# Patient Record
Sex: FEMALE | Race: WHITE
[De-identification: names, ages, dates, MRNs, and addresses within clinical notes are randomized per-mention and may not be internally consistent; named-entity substitution may affect disease eponyms.]

---

## 2018-10-31 ENCOUNTER — HOSPITAL ENCOUNTER (EMERGENCY)
Dept: HOSPITAL 97 - ER | Age: 58
LOS: 1 days | Discharge: HOME | End: 2018-11-01
Payer: COMMERCIAL

## 2018-10-31 DIAGNOSIS — N13.2: Primary | ICD-10-CM

## 2018-10-31 LAB
CAOX CRY URNS QL MICRO: (no result)
UA COMPLETE W REFLEX CULTURE PNL UR: (no result)

## 2018-10-31 PROCEDURE — 96374 THER/PROPH/DIAG INJ IV PUSH: CPT

## 2018-10-31 PROCEDURE — 80053 COMPREHEN METABOLIC PANEL: CPT

## 2018-10-31 PROCEDURE — 85025 COMPLETE CBC W/AUTO DIFF WBC: CPT

## 2018-10-31 PROCEDURE — 74176 CT ABD & PELVIS W/O CONTRAST: CPT

## 2018-10-31 PROCEDURE — 87086 URINE CULTURE/COLONY COUNT: CPT

## 2018-10-31 PROCEDURE — 99284 EMERGENCY DEPT VISIT MOD MDM: CPT

## 2018-10-31 PROCEDURE — 36415 COLL VENOUS BLD VENIPUNCTURE: CPT

## 2018-10-31 PROCEDURE — 87088 URINE BACTERIA CULTURE: CPT

## 2018-10-31 PROCEDURE — 81015 MICROSCOPIC EXAM OF URINE: CPT

## 2018-10-31 PROCEDURE — 76377 3D RENDER W/INTRP POSTPROCES: CPT

## 2018-10-31 PROCEDURE — 81003 URINALYSIS AUTO W/O SCOPE: CPT

## 2018-11-01 VITALS — DIASTOLIC BLOOD PRESSURE: 71 MMHG | OXYGEN SATURATION: 100 % | SYSTOLIC BLOOD PRESSURE: 134 MMHG | TEMPERATURE: 97.9 F

## 2018-11-01 LAB
ALBUMIN SERPL BCP-MCNC: 3.7 G/DL (ref 3.4–5)
ALP SERPL-CCNC: 59 U/L (ref 45–117)
ALT SERPL W P-5'-P-CCNC: 24 U/L (ref 12–78)
AST SERPL W P-5'-P-CCNC: 20 U/L (ref 15–37)
BUN BLD-MCNC: 24 MG/DL (ref 7–18)
GLUCOSE SERPLBLD-MCNC: 74 MG/DL (ref 74–106)
HCT VFR BLD CALC: 40.3 % (ref 36–45)
LYMPHOCYTES # SPEC AUTO: 2.2 K/UL (ref 0.7–4.9)
MCH RBC QN AUTO: 30.6 PG (ref 27–35)
MCV RBC: 90.7 FL (ref 80–100)
PMV BLD: 8.6 FL (ref 7.6–11.3)
POTASSIUM SERPL-SCNC: 4.1 MMOL/L (ref 3.5–5.1)
RBC # BLD: 4.45 M/UL (ref 3.86–4.86)

## 2018-11-01 NOTE — RAD REPORT
EXAM DESCRIPTION:  CT - Stone Protocol - 11/1/2018 3:57 am

 

CLINICAL HISTORY:  Flank pain.

L flank pain

 

COMPARISON:  CT ABD PELVIS W WO CONTRAST dated 12/30/2013

 

TECHNIQUE:  Axial images were obtained without oral or IV contrast. Lack of contrast limits solid org
an and vascular assessment. The field-of-view spans the entirety of the  system partially obscuring
 uppermost abdomen and lung bases. Coronal reformatted images were obtained and reviewed.

 

All CT scans are performed using dose optimization technique as appropriate and may include automated
 exposure control or mA/KV adjustment according to patient size.

 

FINDINGS:  The lower lung fields are clear.

 

Imaged portions of the liver and spleen show no suspicious findings on non-contrast imaging. The panc
reas and adrenal glands are normal. No pathologic lymphadenopathy in the abdomen or pelvis.

 

5 mm stone (630 HU) is present along the bladder mucosa adjacent to the left UVJ resulting in mild le
ft hydronephrosis. Additional stones are present in the left kidney. No right-sided stone or hydronep
hrosis.

 

No bowel obstruction, free air, free fluid or abscess. The appendix is not identified as a discrete s
tructure, however, no secondary findings of appendicitis are identified.

 

No significant bony abnormality.

 

IMPRESSION:  5 mm stone along the bladder mucosal aspect of the left UVJ with moderate left hydroneph
rosis.

 

Additional left nephrolithiasis.

## 2018-11-01 NOTE — EDPHYS
Physician Documentation                                                                           

 [facility]                                                                

Name: Julian Macias                                                                                  

Age: 58 yrs                                                                                       

Sex: Female                                                                                       

: 1960                                                                                   

MRN: [id number]                                                                                   

Arrival Date: 10/31/2018                                                                          

Time: 21:22                                                                                       

Account#: [id number]                                                                            

Bed 13                                                                                            

Private MD: Damaso Hernandez V                                                                      

ED Physician Ricco Tafoya                                                                      

HPI:                                                                                              

                                                                                             

01:02 This 58 yrs old  Female presents to ER via Ambulatory with complaints of Back  wa  

      Pain, Urinary Problem.                                                                      

01:03 This 58 yrs old  Female presents to ER via Ambulatory with complaints of Back  wa  

      Pain, Urinary Problem.                                                                      

01:02 The patient presents with pain that is acute, with no known mechanism of injury.        wa  

01:03 The patient complains of pain in the . The patient complains of pain in the left flank. wa  

      The pain does not radiate. Onset: The symptoms/episode began/occurred today. Modifying      

      factors: The symptoms are alleviated by nothing. the symptoms are aggravated by             

      nothing. Associated signs and symptoms: Pertinent positives: urinary frequency, c/o         

      urine frequency. burning urination x 3 days. began abx for UTI 2 days ago but symptoms      

      not improved. states now R flank pain, Pertinent negatives: fever, headache, vomiting.      

      Severity of pain: At its worst the pain was mild in the emergency department the pain       

      is unchanged. The patient has not experienced similar symptoms in the past. The patient     

      has not recently seen a physician, had abx called in by her doctor. denies fever, n/v.      

                                                                                                  

Historical:                                                                                       

- Allergies:                                                                                      

10/31                                                                                             

22:18 No Known Allergies;                                                                     kr2 

- Home Meds:                                                                                      

22:18 Cefuroxime Oral 250 mg twice a day [Active];                                            kr2 

- PMHx:                                                                                           

22:18 None;                                                                                   kr2 

- PSHx:                                                                                           

22:18 None;                                                                                   kr2 

                                                                                                  

- Immunization history:: Adult Immunizations not up to date.                                      

- Social history:: Smoking status: Patient/guardian denies using tobacco.                         

- Ebola Screening: : No symptoms or risks identified at this time.                                

                                                                                                  

                                                                                                  

ROS:                                                                                              

                                                                                             

01:06 Constitutional: Negative for fever, chills, and weight loss, Eyes: Negative for injury, wa  

      pain, redness, and discharge, ENT: Negative for injury, pain, and discharge, Neck:          

      Negative for injury, pain, and swelling, Cardiovascular: Negative for chest pain,           

      palpitations, and edema, Respiratory: Negative for shortness of breath, cough,              

      wheezing, and pleuritic chest pain, Abdomen/GI: Negative for abdominal pain, nausea,        

      vomiting, diarrhea, and constipation, Back: Negative for injury and pain, MS/Extremity:     

      Negative for injury and deformity, Skin: Negative for injury, rash, and discoloration,      

      Neuro: Negative for headache, weakness, numbness, tingling, and seizure, Psych:             

      Negative for depression, anxiety, suicide ideation, homicidal ideation, and                 

      hallucinations.                                                                             

      : Positive for urinary symptoms, flank pain, burning with urination.                      

                                                                                                  

Exam:                                                                                             

:06 Constitutional:  This is a well developed, well nourished patient who is awake, alert,  wa  

      and in no acute distress. Head/Face:  Normocephalic, atraumatic. Eyes:  Pupils equal        

      round and reactive to light, extra-ocular motions intact.  Lids and lashes normal.          

      Conjunctiva and sclera are non-icteric and not injected.  Cornea within normal limits.      

      Periorbital areas with no swelling, redness, or edema. ENT:  Nares patent. No nasal         

      discharge, no septal abnormalities noted.  Tympanic membranes are normal and external       

      auditory canals are clear.  Oropharynx with no redness, swelling, or masses, exudates,      

      or evidence of obstruction, uvula midline.  Mucous membranes moist. Neck:  Trachea          

      midline, no thyromegaly or masses palpated, and no cervical lymphadenopathy.  Supple,       

      full range of motion without nuchal rigidity, or vertebral point tenderness.  No            

      Meningismus. Chest/axilla:  Normal chest wall appearance and motion.  Nontender with no     

      deformity.  No lesions are appreciated. Cardiovascular:  Regular rate and rhythm with a     

      normal S1 and S2.  No gallops, murmurs, or rubs.  Normal PMI, no JVD.  No pulse             

      deficits. Respiratory:  Lungs have equal breath sounds bilaterally, clear to                

      auscultation and percussion.  No rales, rhonchi or wheezes noted.  No increased work of     

      breathing, no retractions or nasal flaring. Abdomen/GI:  Soft, non-tender, with normal      

      bowel sounds.  No distension or tympany.  No guarding or rebound.  No evidence of           

      tenderness throughout. Back:  No spinal tenderness.  No costovertebral tenderness.          

      Full range of motion. Skin:  Warm, dry with normal turgor.  Normal color with no            

      rashes, no lesions, and no evidence of cellulitis. MS/ Extremity:  Pulses equal, no         

      cyanosis.  Neurovascular intact.  Full, normal range of motion. Neuro:  Awake and           

      alert, GCS 15, oriented to person, place, time, and situation.  Cranial nerves II-XII       

      grossly intact.  Motor strength 5/5 in all extremities.  Sensory grossly intact.            

      Cerebellar exam normal.  Normal gait. Psych:  Awake, alert, with orientation to person,     

      place and time.  Behavior, mood, and affect are within normal limits.                       

                                                                                                  

Vital Signs:                                                                                      

10/31                                                                                             

22:19 Pulse 60; Resp 17; Temp 98.6; Pulse Ox 99% ; Weight 60.33 kg; Height 5 ft. 6 in.        kr2 

      (167.64 cm); Pain 10;                                                                     

23:00  / 71; Pulse 56; Resp 18 S; Temp 97.9(O); Pulse Ox 100% on R/A;                   cc3 

                                                                                             

00:20  / 78; Pulse 58; Resp 17 S; Pulse Ox 100% on R/A;                                 cc3 

01:30  / 77; Pulse 57; Resp 16 S; Pulse Ox 99% on R/A;                                  cc3 

02:15  / 72; Pulse 59; Resp 18 S; Pulse Ox 100% on R/A;                                 cc3 

10/31                                                                                             

22:19 Body Mass Index 21.47 (60.33 kg, 167.64 cm)                                             kr2 

                                                                                                  

MDM:                                                                                              

10/31                                                                                             

22:46 Patient medically screened.                                                             wa  

                                                                                             

01:06 Differential diagnosis: nephrolithiasis, pyelonephritis, UTI.                           wa  

01:08 Test interpretation: by ED physician or midlevel provider: labs noted for 2+ blood.     wa  

      otherwise nml.                                                                              

02:03 Data reviewed: vital signs, nurses notes. Test interpretation: by ED physician or       wa  

      midlevel provider: moderate L hydronephrosis secondary to obstructing 5 mm stone at         

      left UVJ. L sided nephrolithiasis. Response to treatment: the patient's symptoms have       

      markedly improved after treatment.                                                          

02:10 Data reviewed: post-void residual, 10 cc's.                                             wa  

                                                                                                  

10/31                                                                                             

21:32 Order name: Urine Culture                                                               [facility] 

10/31                                                                                             

21:32 Order name: Urine Microscopic Only                                                      [facility] 

10/31                                                                                             

21:33 Order name: Urine Culture                                                               [facility]

10/31                                                                                             

21:33 Order name: Urine Microscopic Only; Complete Time: 00:01                                [facility]

10/31                                                                                             

23:48 Order name: Urine Dipstick--Ancillary (enter results); Complete Time: 00:01             em  

                                                                                             

00:03 Order name: CBC with Diff; Complete Time: :                                         wa  

10/31                                                                                             

21:32 Order name: Urine Dipstick-Ancillary (obtain specimen); Complete Time: 23:25            snw 

                                                                                             

00:03 Order name: CMP; Complete Time: 01:                                                   wa  

                                                                                             

00:38 Order name: Stone Protocol                                                              EDMS

                                                                                                  

Administered Medications:                                                                         

10/31                                                                                             

23:35 Drug: Tylenol 1000 mg Route: PO;                                                        cc3 

                                                                                             

00:15 Follow up: Response: No adverse reaction; Pain is decreased                             cc3 

02:10 Drug: TORadol 30 mg Route: IVP; Site: right antecubital;                                cc3 

02:25 Follow up: Response: No adverse reaction; Pain is decreased                             cc3 

                                                                                                  

                                                                                                  

Disposition:                                                                                      

18 02:07 Discharged to Home. Impression: Left Ureteral Stone, Left Hydronephrosis.          

- Condition is Stable.                                                                            

                                                                                                  

- Prescriptions for Flomax 0.4 mg Oral Capsule, Sust. Release 24 hr - take 1 capsule by           

  ORAL route once daily for 7 days 1/2 hour following the same meal each day; 7 capsule.          

- Medication Reconciliation Form, Thank You Letter, Antibiotic Education, Prescription            

  Opioid Use form.                                                                                

- Follow up: Quan Morillo MD; When: 5 - 6 days; Reason: if symptoms worsen and or             

  does not improve.                                                                               

- Problem is new.                                                                                 

- Symptoms have improved.                                                                         

- Notes: follow up with the urologist for further evaluation if your pain does not                

  improve and or worsen. take flomax as prescribed to help pass the stone. return to ER           

  for rapidly worsening and or severe pain                                                        

                                                                                                  

                                                                                                  

Signatures:                                                                                       

Dispatcher [employer]                           EDMS                                                 

Agata Aaron, FNP-C                 FNP-Csnw                                                  

Jeffrey Tripathi, LVN                       LVN  em                                                   

Ricco Tafoya MD MD wa Reaves, Karey, RN                       RN   kr2                                                  

Stephania Paz                             cc3                                                  

                                                                                                  

Corrections: (The following items were deleted from the chart)                                    

00:38 00:06 Abdomen Pelvis Wo Con+CT.RAD.BRZ ordered. EDMS                                    EDMS

00:53 00:36 Labs - recollect needed ordered. em                                               em  

02:32 02:07 2018 02:07 Discharged to Home. Impression: Left Ureteral Stone; Left        cc3 

      Hydronephrosis. Condition is Stable. Forms are Medication Reconciliation Form, Thank        

      You Letter, Antibiotic Education, Prescription Opioid Use. Follow up: Quan Morillo;       

      When: 5 - 6 days; Reason: if symptoms worsen and or does not improve. Problem is new.       

      Symptoms have improved. wa                                                                  

                                                                                                  

**************************************************************************************************

## 2018-11-01 NOTE — ER
Nurse's Notes                                                                                     

 [facility]                                                                

Name: Julian Macias                                                                                  

Age: 58 yrs                                                                                       

Sex: Female                                                                                       

: 1960                                                                                   

MRN: [id number]                                                                                   

Arrival Date: 10/31/2018                                                                          

Time: 21:22                                                                                       

Account#: [id number]                                                                            

Bed 13                                                                                            

Private MD: Damaso Hernandez V                                                                      

Diagnosis: Left Ureteral Stone;Left Hydronephrosis                                                

                                                                                                  

Presentation:                                                                                     

10/31                                                                                             

22:15 Presenting complaint: Patient states: I started antibiotics for a UTI on Tuesday and I  kr2 

      started having back pain this evening. Denies blood in urine, denies n/v/d. Transition      

      of care: patient was not received from another setting of care. Onset of symptoms was       

      2018. Risk Assessment: Do you want to hurt yourself or someone else?            

      Patient reports no desire to harm self or others. Initial Sepsis Screen: Does the           

      patient meet any 2 criteria? No. Patient's initial sepsis screen is negative. Does the      

      patient have a suspected source of infection? Yes: Dysuria/Frequency/Urgency/UTI. Care      

      prior to arrival: None.                                                                     

22:15 Method Of Arrival: Ambulatory                                                           [facility] 

22:15 Acuity: BE 4                                                                           kr2 

                                                                                                  

Triage Assessment:                                                                                

22:18 General: Appears in no apparent distress. uncomfortable, well groomed, well developed,  kr2 

      well nourished, Behavior is calm, cooperative, appropriate for age. Pain: Complains of      

      pain in lower back Pain does not radiate. Pain currently is 5 out of 10 on a pain           

      scale. Quality of pain is described as aching, Is continuous, Alleviated by nothing.        

      Musculoskeletal: Circulation, motion, and sensation intact.                                 

                                                                                                  

Historical:                                                                                       

- Allergies:                                                                                      

22:18 No Known Allergies;                                                                     kr2 

- Home Meds:                                                                                      

22:18 Cefuroxime Oral 250 mg twice a day [Active];                                            kr2 

- PMHx:                                                                                           

22:18 None;                                                                                   kr2 

- PSHx:                                                                                           

22:18 None;                                                                                   kr2 

                                                                                                  

- Immunization history:: Adult Immunizations not up to date.                                      

- Social history:: Smoking status: Patient/guardian denies using tobacco.                         

- Ebola Screening: : No symptoms or risks identified at this time.                                

                                                                                                  

                                                                                                  

Screenin:00 Abuse screen: Denies threats or abuse. Denies injuries from another. Nutritional        cc3 

      screening: No deficits noted. Tuberculosis screening: No symptoms or risk factors           

      identified. Fall Risk Ambulatory Aid- None/Bed Rest/Nurse Assist (0 pts). Gait-             

      Normal/Bed Rest/Wheelchair (0 pts) Mental Status- Oriented to own ability (0 pts).          

                                                                                                  

Assessment:                                                                                       

23:00 Reassessment: Patient appears in no apparent distress at this time. Patient and/or      cc3 

      family updated on plan of care and expected duration. Pain level reassessed. Patient is     

      alert, oriented x 3, equal unlabored respirations, skin warm/dry/pink. Neuro: Level of      

      Consciousness is awake, alert, obeys commands, Oriented to person, place, time,             

      situation, Appropriate for age.                                                             

                                                                                             

00:55 Reassessment: Patient appears in no apparent distress at this time. Patient and/or      cc3 

      family updated on plan of care and expected duration. Pain level reassessed. Patient is     

      alert, oriented x 3, equal unlabored respirations, skin warm/dry/pink. Patient came         

      back from CT scan department, bladder scan done with 10 mL retained urine, Dr. Tafoya       

      informed.                                                                                   

01:30 Reassessment: Patient appears in no apparent distress at this time. Patient and/or      cc3 

      family updated on plan of care and expected duration. Pain level reassessed. Patient is     

      alert, oriented x 3, equal unlabored respirations, skin warm/dry/pink.                      

02:25 Reassessment: Patient appears in no apparent distress at this time. Patient and/or      cc3 

      family updated on plan of care and expected duration. Pain level reassessed. Patient is     

      alert, oriented x 3, equal unlabored respirations, skin warm/dry/pink. Dr. Tafoya           

      discharged the patient with prescription given. IV cannula removed and patient left ER      

      vitally stable and ambulatory with her .                                             

                                                                                                  

Vital Signs:                                                                                      

10/31                                                                                             

22:19 Pulse 60; Resp 17; Temp 98.6; Pulse Ox 99% ; Weight 60.33 kg; Height 5 ft. 6 in.        kr2 

      (167.64 cm); Pain /10;                                                                     

23:00  / 71; Pulse 56; Resp 18 S; Temp 97.9(O); Pulse Ox 100% on R/A;                   cc3 

11                                                                                             

00:20  / 78; Pulse 58; Resp 17 S; Pulse Ox 100% on R/A;                                 cc3 

01:30  / 77; Pulse 57; Resp 16 S; Pulse Ox 99% on R/A;                                  cc3 

02:15  / 72; Pulse 59; Resp 18 S; Pulse Ox 100% on R/A;                                 cc3 

10/31                                                                                             

22:19 Body Mass Index 21.47 (60.33 kg, 167.64 cm)                                             kr2 

                                                                                                  

ED Course:                                                                                        

10/31                                                                                             

21:22 Patient arrived in ED.                                                                  am2 

21:23 Damaso Hernandez MD is Private Physician.                                                 am2 

22:17 Triage completed.                                                                       kr2 

22:46 Ricco Tafoya MD is Attending Physician.                                             wa  

22:51 Stephania Paz is Primary Nurse.                                                      cc3 

23:00 Arm band placed on left wrist.                                                          cc3 

23:00 Patient has correct armband on for positive identification. Bed in low position. Call   cc3 

      light in reach. Side rails up X 1. Pulse ox on. NIBP on.                                    

                                                                                             

00:15 Inserted saline lock: 20 gauge in right antecubital area, using aseptic technique.      cc3 

      Blood collected.                                                                            

00:57 Stone Protocol In Process Unspecified.                                                  EDMS

02:04 Quan Morillo MD is Referral Physician.                                              wa  

02:25 No provider procedures requiring assistance completed. IV discontinued, intact,         cc3 

      bleeding controlled, No redness/swelling at site. Pressure dressing applied.                

                                                                                                  

Administered Medications:                                                                         

10/31                                                                                             

23:35 Drug: Tylenol 1000 mg Route: PO;                                                        cc3 

                                                                                             

00:15 Follow up: Response: No adverse reaction; Pain is decreased                             cc3 

02:10 Drug: TORadol 30 mg Route: IVP; Site: right antecubital;                                cc3 

02:25 Follow up: Response: No adverse reaction; Pain is decreased                             cc3 

                                                                                                  

                                                                                                  

Outcome:                                                                                          

02:07 Discharge ordered by MD.                                                                wa  

02:25 Discharged to home ambulatory, with family.                                             cc3 

02:25 Condition: stable                                                                           

02:25 Discharge instructions given to patient, family, Instructed on discharge instructions,      

      follow up and referral plans. medication usage, Demonstrated understanding of               

      instructions, follow-up care, medications, Prescriptions given X 1.                         

02:32 Patient left the ED.                                                                    cc3 

                                                                                                  

Signatures:                                                                                       

Dispatcher [employer]                           EDMS                                                 

Milena Hunt                               am2                                                  

Ricco Tafoya MD MD wa Reaves, Karey, RN                       RN   kr2                                                  

Stephania Paz                             cc3                                                  

                                                                                                  

Corrections: (The following items were deleted from the chart)                                    

03:15 00:55 Reassessment: Patient appears in no apparent distress at this time. Patient       cc3 

      and/or family updated on plan of care and expected duration. Pain level reassessed.         

      Patient is alert, oriented x 3, equal unlabored respirations, skin warm/dry/pink.           

      Patient came back from CT scan department, bladder scan done with 10 mL retained urine.     

      cc3                                                                                         

                                                                                                  

**************************************************************************************************

## 2021-01-31 ENCOUNTER — HOSPITAL ENCOUNTER (EMERGENCY)
Dept: HOSPITAL 97 - ER | Age: 61
Discharge: HOME | End: 2021-01-31
Payer: COMMERCIAL

## 2021-01-31 VITALS — SYSTOLIC BLOOD PRESSURE: 121 MMHG | OXYGEN SATURATION: 100 % | DIASTOLIC BLOOD PRESSURE: 70 MMHG

## 2021-01-31 VITALS — TEMPERATURE: 97.2 F

## 2021-01-31 DIAGNOSIS — N20.2: Primary | ICD-10-CM

## 2021-01-31 LAB
ALBUMIN SERPL BCP-MCNC: 4 G/DL (ref 3.4–5)
ALP SERPL-CCNC: 58 U/L (ref 45–117)
ALT SERPL W P-5'-P-CCNC: 23 U/L (ref 12–78)
AST SERPL W P-5'-P-CCNC: 19 U/L (ref 15–37)
BUN BLD-MCNC: 23 MG/DL (ref 7–18)
CAOX CRY URNS QL MICRO: (no result)
GLUCOSE SERPLBLD-MCNC: 125 MG/DL (ref 74–106)
HCT VFR BLD CALC: 42.9 % (ref 36–45)
LIPASE SERPL-CCNC: 50 U/L (ref 73–393)
LYMPHOCYTES # SPEC AUTO: 1.1 K/UL (ref 0.7–4.9)
PMV BLD: 8.6 FL (ref 7.6–11.3)
POTASSIUM SERPL-SCNC: 3.7 MMOL/L (ref 3.5–5.1)
RBC # BLD: 4.74 M/UL (ref 3.86–4.86)

## 2021-01-31 PROCEDURE — 80076 HEPATIC FUNCTION PANEL: CPT

## 2021-01-31 PROCEDURE — 99284 EMERGENCY DEPT VISIT MOD MDM: CPT

## 2021-01-31 PROCEDURE — 96361 HYDRATE IV INFUSION ADD-ON: CPT

## 2021-01-31 PROCEDURE — 96374 THER/PROPH/DIAG INJ IV PUSH: CPT

## 2021-01-31 PROCEDURE — 74177 CT ABD & PELVIS W/CONTRAST: CPT

## 2021-01-31 PROCEDURE — 81015 MICROSCOPIC EXAM OF URINE: CPT

## 2021-01-31 PROCEDURE — 81003 URINALYSIS AUTO W/O SCOPE: CPT

## 2021-01-31 PROCEDURE — 36415 COLL VENOUS BLD VENIPUNCTURE: CPT

## 2021-01-31 PROCEDURE — 83690 ASSAY OF LIPASE: CPT

## 2021-01-31 PROCEDURE — 85025 COMPLETE CBC W/AUTO DIFF WBC: CPT

## 2021-01-31 PROCEDURE — 96375 TX/PRO/DX INJ NEW DRUG ADDON: CPT

## 2021-01-31 PROCEDURE — 80048 BASIC METABOLIC PNL TOTAL CA: CPT

## 2021-01-31 NOTE — RAD REPORT
EXAM DESCRIPTION:  CT - Abdomen   Pelvis W Contrast - 1/31/2021 11:52 am

 

CLINICAL HISTORY:  Abdominal pain

 

COMPARISON:  2018

 

TECHNIQUE:  Computed axial tomography of the abdomen pelvis was obtained. 100 cc Isovue-300 was admin
istered intravenously. Oral contrast was not requested which limits evaluation of bowel.

 

All CT scans are performed using dose optimization technique as appropriate and may include automated
 exposure control or mA/KV adjustment according to patient size.

 

FINDINGS:  The liver, spleen, pancreas, and adrenals appear unremarkable.

 

Nonobstructing left renal calculi. Small left renal cyst.

 

Delay in concentration contrast right kidney. Mild to moderate right hydronephrosis. Right ureter is 
dilated. 5 millimeter calculus right UVJ. Hounsfield unit 843.

 

There is no evidence of diverticulitis. Small umbilical hernia

 

IMPRESSION:  5 millimeter calculus right UPJ resulting in mild to moderate right hydronephrosis

## 2021-01-31 NOTE — EDPHYS
Physician Documentation                                                                           

 [facility]                                                                 

Name: Julian Macias                                                                                  

Age: 60 yrs                                                                                       

Sex: Female                                                                                       

: 1960                                                                                   

MRN: [id number]                                                                                   

Arrival Date: 2021                                                                          

Time: 10:26                                                                                       

Account#: [id number]                                                                            

Bed 4                                                                                             

Private MD:                                                                                       

ED Physician Lashanda Chahal                                                                    

HPI:                                                                                              

                                                                                             

12:11 This 60 yrs old  Female presents to ER via Ambulatory with complaints of       ma2 

      Kidney Pain.                                                                                

12:11 The patient complains of pain in the right low back. Onset: The symptoms/episode        ma2 

      began/occurred gradually, 3 hour(s) ago. Associated signs and symptoms: Pertinent           

      positives: nausea, vomiting, Pertinent negatives: urinary frequency, headache,              

      hematuria, nausea. Severity of pain: At its worst the pain was moderate in the              

      emergency department the pain is unchanged. The patient has not experienced similar         

      symptoms in the past.                                                                       

                                                                                                  

Historical:                                                                                       

- Allergies:                                                                                      

10:40 No Known Allergies;                                                                     hb  

- Home Meds:                                                                                      

10:40 None [Active];                                                                          hb  

- PMHx:                                                                                           

10:40 None;                                                                                   hb  

- PSHx:                                                                                           

10:40 D \T\ C;                                                                                  hb

                                                                                                  

- Immunization history:: Adult Immunizations up to date.                                          

- Social history:: Smoking status: Patient denies any tobacco usage or history of.                

  Patient/guardian denies using alcohol, street drugs, The patient lives with family.             

- Family history:: not pertinent.                                                                 

                                                                                                  

                                                                                                  

ROS:                                                                                              

12:11 Constitutional: Negative for fever, chills, and weight loss.                            ma2 

12:11 All other systems are negative.                                                             

                                                                                                  

Exam:                                                                                             

12:11 Constitutional:  This is a well developed, well nourished patient who is awake, alert,  ma2 

      and in no acute distress. Chest/axilla:  Normal chest wall appearance and motion.           

      Nontender with no deformity.  No lesions are appreciated. Cardiovascular:  Regular rate     

      and rhythm with a normal S1 and S2.  No gallops, murmurs, or rubs.  Normal PMI, no JVD.     

       No pulse deficits. Respiratory:  Lungs have equal breath sounds bilaterally, clear to      

      auscultation and percussion.  No rales, rhonchi or wheezes noted.  No increased work of     

      breathing, no retractions or nasal flaring. Abdomen/GI:  Soft, non-tender, with normal      

      bowel sounds.  No distension or tympany.  No guarding or rebound.  No evidence of           

      tenderness throughout. Back:  No spinal tenderness.  No costovertebral tenderness.          

      Full range of motion. Skin:  Warm, dry with normal turgor.  Normal color with no            

      rashes, no lesions, and no evidence of cellulitis. MS/ Extremity:  Pulses equal, no         

      cyanosis.  Neurovascular intact.  Full, normal range of motion. Neuro:  Awake and           

      alert, GCS 15, oriented to person, place, time, and situation.  Cranial nerves II-XII       

      grossly intact.  Motor strength 5/5 in all extremities.  Sensory grossly intact.            

      Cerebellar exam normal.  Normal gait.                                                       

                                                                                                  

Vital Signs:                                                                                      

10:38  / 68; Pulse 56; Resp 16; Temp 97.2; Pulse Ox 97% on R/A; Pain 8/10;              hb  

11:31  / 70; Pulse 66; Resp 16; Pulse Ox 100% ;                                         sv  

                                                                                                  

MDM:                                                                                              

10:35 Patient medically screened.                                                             ma2 

12:11 Differential diagnosis: nephrolithiasis, pyelonephritis, UTI. Data reviewed: vital      ma2 

      signs, nurses notes. Counseling: I had a detailed discussion with the patient and/or        

      guardian regarding: the historical points, exam findings, and any diagnostic results        

      supporting the discharge/admit diagnosis, the presence of at least one elevated blood       

      pressure reading (>120/80) during this emergency department visit, the need for             

      outpatient follow up. Response to treatment: the patient's symptoms have markedly           

      improved after treatment.                                                                   

                                                                                                  

                                                                                             

10:40 Order name: Basic Metabolic Panel; Complete Time: 11:53                                 ma2 

                                                                                             

10:40 Order name: CBC with Diff; Complete Time: 11:53                                         ma2 

                                                                                             

10:40 Order name: Hepatic Function; Complete Time: 11:53                                      ma                                                                                             

10:40 Order name: Lipase; Complete Time: 11:                                                ma                                                                                             

11:15 Order name: Urine Dipstick--Ancillary (enter results); Complete Time: 11:53             eb  

                                                                                             

11:15 Order name: Urine Microscopic Only: verbal order per Dr. Chahal; Complete Time: 11:53  dh3 

                                                                                             

10:40 Order name: CT Abd/Pelvis - IV Contrast Only; Complete Time: 12:10                      ma2 

                                                                                             

10:40 Order name: IV Saline Lock; Complete Time: 11:10                                        ma2 

                                                                                             

10:40 Order name: Labs collected and sent; Complete Time: 11:10                               ma2 

                                                                                             

10:40 Order name: Urine Dipstick-Ancillary (obtain specimen); Complete Time: 11: 

                                                                                                  

Administered Medications:                                                                         

11:09 Drug: Zofran (Ondansetron) 4 mg Route: IVP; Site: right antecubital;                    hb  

12:00 Follow up: Response: No adverse reaction                                                hb  

11:10 Drug: NS 0.9% 1000 ml Route: IV; Rate: 1 bolus; Site: right antecubital;                hb  

12:00 Follow up: Response: No adverse reaction; IV Status: Completed infusion; IV Intake:     hb  

      1000ml                                                                                      

11:10 Drug: morphine 4 mg Route: IVP; Site: right antecubital;                                hb  

11:38 Follow up: Response: No adverse reaction                                                hb  

12:20 Drug: Flomax 0.4 mg Route: PO;                                                          hb  

12:55 Follow up: Response: No adverse reaction                                                hb  

12:25 Drug: Zofran (Ondansetron) 4 mg Route: IVP; Site: right antecubital;                    hb  

12:55 Follow up: Response: No adverse reaction                                                hb  

12:25 Drug: morphine 2 mg Route: IVP; Site: right antecubital;                                hb  

12:55 Follow up: Response: No adverse reaction                                                hb  

12:57 CANCELLED (Duplicate Order): morphine 2 mg IVP once; (PAIN>8) RASS on ADMN: Combtv4,    hb  

      Very Agttd3, Agttd2, Rstlss1, AlertClm0, Drwsy-1, LtSdtn-2, ModSdtn-3, DpSdtn-4,            

      UnArsble-5 x2                                                                               

                                                                                                  

                                                                                                  

Disposition:                                                                                      

21 12:12 Discharged to Home. Impression: Calculus of kidney and ureter - right.             

- Condition is Stable.                                                                            

- Discharge Instructions: Kidney Stones, Easy-to-Read, Dietary Guidelines to Help                 

  Prevent Kidney Stones.                                                                          

- Prescriptions for Flomax 0.4 mg Oral Capsule, Sust. Release 24 hr - take 1 capsule by           

  ORAL route once daily 1/2 hour following the same meal each day; 30 capsule.                    

  Diclofenac Sodium 75 mg Oral Tablet Sustained Release - take 1 tablet by ORAL route 2           

  times per day; 30 tablet. Zofran 4 mg Oral Tablet - take 1 tablet by ORAL route every           

  12 hours As needed; 20 tablet.                                                                  

- Medication Reconciliation Form, Thank You Letter, Antibiotic Education, Prescription            

  Opioid Use, Work release form form.                                                             

- Follow up: Jose Chen MD; When: Tomorrow; Reason: If symptoms return.                     

                                                                                                  

                                                                                                  

                                                                                                  

Signatures:                                                                                       

Dispatcher [employer]                           Jacquelyn Alarcon RN                     RN                                                      

Lashanda Chahal MD MD   ma2                                                  

                                                                                                  

Corrections: (The following items were deleted from the chart)                                    

12:57 12:57 morphine 2 mg IVP once; (PAIN>8) RASS on ADMN: Combtv4, Very Agttd3, Agttd2,      hb  

      Rstlss1, AlertClm0, Drwsy-1, LtSdtn-2, ModSdtn-3, DpSdtn-4, UnArsble-5 x2 ordered. hb       

12:59 12:12 2021 12:12 Discharged to Home. Impression: Calculus of kidney and ureter -  hb  

      right. Condition is Stable. Prescriptions for Flomax 0.4 mg Oral Capsule, Sust. Release     

      24 hr - take 1 capsule by ORAL route once daily 1/2 hour following the same meal each       

      day; 30 capsule, Diclofenac Sodium 75 mg Oral Tablet Sustained Release - take 1 tablet      

      by ORAL route 2 times per day; 30 tablet. and Forms are Medication Reconciliation Form,     

      Thank You Letter, Antibiotic Education, Prescription Opioid Use. Follow up: Jose Chen; When: Tomorrow; Reason: If symptoms return. ma2                                    

                                                                                                  

**************************************************************************************************

## 2021-01-31 NOTE — ER
Nurse's Notes                                                                                     

 [facility]                                                                 

Name: Julian Macias                                                                                  

Age: 60 yrs                                                                                       

Sex: Female                                                                                       

: 1960                                                                                   

MRN: [id number]                                                                                   

Arrival Date: 2021                                                                          

Time: 10:26                                                                                       

Account#: [id number]                                                                            

Bed 4                                                                                             

Private MD:                                                                                       

Diagnosis: Calculus of kidney and ureter-right                                                    

                                                                                                  

Presentation:                                                                                     

                                                                                             

10:38 Chief complaint: N/V and right flank pain upon waking today. Diarrhea x 1. Coronavirus  hb  

      screen: Client presents with at least one sign or symptom that may indicate                 

      coronavirus-19. Standard/surgical mask placed on the client. Provider contacted for         

      isolation considerations. Ebola Screen: No symptoms or risks identified at this time.       

      Initial Sepsis Screen: Does the patient meet any 2 criteria? No. Patient's initial          

      sepsis screen is negative. Does the patient have a suspected source of infection? No.       

      Patient's initial sepsis screen is negative. Risk Assessment: Do you want to hurt           

      yourself or someone else? Patient reports no desire to harm self or others. Onset of        

      symptoms was 2021.                                                              

10:38 Method Of Arrival: Ambulatory                                                           hb  

10:38 Acuity: BE 3                                                                           hb  

                                                                                                  

Triage Assessment:                                                                                

10:40 General: Appears in no apparent distress. uncomfortable, Behavior is calm, cooperative. hb  

      Pain: Pain currently is 8 out of 10 on a pain scale. EENT: No signs and/or symptoms         

      were reported regarding the EENT system. Neuro: Level of Consciousness is awake, alert,     

      obeys commands, Oriented to person, place, time, situation. Cardiovascular: Capillary       

      refill < 3 seconds Patient's skin is warm and dry. Respiratory: Respiratory effort is       

      even, unlabored, Respiratory pattern is regular, symmetrical. GI: Reports nausea,           

      vomiting. : No signs and/or symptoms were reported regarding the genitourinary            

      system. Derm: Skin is pink, warm \T\ dry. Musculoskeletal: No signs and/or symptoms         

      reported regarding the musculoskeletal system.                                              

                                                                                                  

Historical:                                                                                       

- Allergies:                                                                                      

10:40 No Known Allergies;                                                                     hb  

- Home Meds:                                                                                      

10:40 None [Active];                                                                          hb  

- PMHx:                                                                                           

10:40 None;                                                                                   hb  

- PSHx:                                                                                           

10:40 D \T\ C;                                                                                  hb

                                                                                                  

- Immunization history:: Adult Immunizations up to date.                                          

- Social history:: Smoking status: Patient denies any tobacco usage or history of.                

  Patient/guardian denies using alcohol, street drugs, The patient lives with family.             

- Family history:: not pertinent.                                                                 

                                                                                                  

                                                                                                  

Screening:                                                                                        

10:41 Abuse screen: Denies threats or abuse. Denies injuries from another. Nutritional        hb  

      screening: No deficits noted. Tuberculosis screening: No symptoms or risk factors           

      identified. Fall Risk None identified.                                                      

                                                                                                  

Assessment:                                                                                       

10:41 General: see triage assessment.                                                         hb  

12:00 Reassessment: Patient appears in no apparent distress at this time. Patient and/or      hb  

      family updated on plan of care and expected duration. Pain level reassessed. Patient is     

      alert, oriented x 3, equal unlabored respirations, skin warm/dry/pink.                      

                                                                                                  

Vital Signs:                                                                                      

10:38  / 68; Pulse 56; Resp 16; Temp 97.2; Pulse Ox 97% on R/A; Pain 8/10;              hb  

11:31  / 70; Pulse 66; Resp 16; Pulse Ox 100% ;                                         sv  

                                                                                                  

ED Course:                                                                                        

10:26 Patient arrived in ED.                                                                  ds1 

10:35 Jacquelyn Blancas, RN is Primary Nurse.                                                   hb  

10:35 Lashanda Chahal MD is Attending Physician.                                           ma2 

10:39 Triage completed.                                                                       hb  

10:40 Arm band placed on.                                                                     hb  

10:41 Patient has correct armband on for positive identification. Placed in gown. Bed in low  hb  

      position. Call light in reach.                                                              

11:00 Urine collected: clean catch specimen, tea colored.                                     dh3 

11:05 Initial lab(s) drawn, by me, sent to lab. Inserted saline lock: 20 gauge in right       dh3 

      antecubital area, using aseptic technique. Blood collected.                                 

11:52 CT Abd/Pelvis - IV Contrast Only In Process Unspecified.                                EDMS

12:12 Jose Chen MD is Referral Physician.                                              ma2 

12:59 No provider procedures requiring assistance completed. IV discontinued, intact,         hb  

      bleeding controlled, No redness/swelling at site. Pressure dressing applied.                

                                                                                                  

Administered Medications:                                                                         

11:09 Drug: Zofran (Ondansetron) 4 mg Route: IVP; Site: right antecubital;                    hb  

12:00 Follow up: Response: No adverse reaction                                                hb  

11:10 Drug: NS 0.9% 1000 ml Route: IV; Rate: 1 bolus; Site: right antecubital;                hb  

12:00 Follow up: Response: No adverse reaction; IV Status: Completed infusion; IV Intake:     hb  

      1000ml                                                                                      

11:10 Drug: morphine 4 mg Route: IVP; Site: right antecubital;                                hb  

11:38 Follow up: Response: No adverse reaction                                                hb  

12:20 Drug: Flomax 0.4 mg Route: PO;                                                          hb  

12:55 Follow up: Response: No adverse reaction                                                hb  

12:25 Drug: Zofran (Ondansetron) 4 mg Route: IVP; Site: right antecubital;                    hb  

12:55 Follow up: Response: No adverse reaction                                                hb  

12:25 Drug: morphine 2 mg Route: IVP; Site: right antecubital;                                hb  

12:55 Follow up: Response: No adverse reaction                                                hb  

12:57 CANCELLED (Duplicate Order): morphine 2 mg IVP once; (PAIN>8) RASS on ADMN: Combtv4,    hb  

      Very Agttd3, Agttd2, Rstlss1, AlertClm0, Drwsy-1, LtSdtn-2, ModSdtn-3, DpSdtn-4,            

      UnArsble-5 x2                                                                               

                                                                                                  

                                                                                                  

Intake:                                                                                           

12:00 IV: 1000ml; Total: 1000ml.                                                              hb  

                                                                                                  

Outcome:                                                                                          

12:12 Discharge ordered by MD. bonilla 

12:59 Discharged to home ambulatory.                                                          hb  

12:59 Condition: stable                                                                           

12:59 Discharge instructions given to patient, Instructed on discharge instructions, follow       

      up and referral plans. medication usage, Demonstrated understanding of instructions,        

      follow-up care, medications, Prescriptions given X 3.                                       

12:59 Patient left the ED.                                                                    hb  

                                                                                                  

Signatures:                                                                                       

Dispatcher [employer]                           EDNicole Tirado RN RN sv Sanford, Demi                                ds1                                                  

Jacquelyn Blancas RN RN hb Herrera, Deanna                              3                                                  

Lashanda Chahal MD MD ma2                                                  

                                                                                                  

Corrections: (The following items were deleted from the chart)                                    

12:59 12:59 IV discontinued, intact, bleeding controlled, No redness/swelling at site.        hb  

      Pressure dressing applied, hb                                                               

                                                                                                  

**************************************************************************************************